# Patient Record
Sex: MALE | Race: WHITE | NOT HISPANIC OR LATINO | Employment: FULL TIME | URBAN - METROPOLITAN AREA
[De-identification: names, ages, dates, MRNs, and addresses within clinical notes are randomized per-mention and may not be internally consistent; named-entity substitution may affect disease eponyms.]

---

## 2024-04-03 ENCOUNTER — TELEPHONE (OUTPATIENT)
Dept: UROLOGY | Facility: CLINIC | Age: 29
End: 2024-04-03

## 2024-04-03 DIAGNOSIS — N20.0 NEPHROLITHIASIS: Primary | ICD-10-CM

## 2024-04-03 NOTE — TELEPHONE ENCOUNTER
PT IS SCHEDULED TO SEE SELIN 5/15  FOR A NEW PT APPT    WE NEED TO KNOW WHAT INSURANCE PATIENT HAS, CALLED AND LMOM TO LET US KNOW.

## 2024-05-15 ENCOUNTER — TELEPHONE (OUTPATIENT)
Dept: UROLOGY | Facility: CLINIC | Age: 29
End: 2024-05-15

## 2024-05-15 NOTE — TELEPHONE ENCOUNTER
CALLED PATIENT TO OFFER R/S OF CX'D NEW PT APPT 5/15 W/ SELIN    SPOKE W/ PT WHO DECLINED TO R/S.    PT WAS REFERRED BY [facility], ANYTHING ELSE TO DO?